# Patient Record
Sex: FEMALE | Race: WHITE | NOT HISPANIC OR LATINO | Employment: UNEMPLOYED | URBAN - METROPOLITAN AREA
[De-identification: names, ages, dates, MRNs, and addresses within clinical notes are randomized per-mention and may not be internally consistent; named-entity substitution may affect disease eponyms.]

---

## 2018-03-03 ENCOUNTER — COMMUNICATION - HEALTHEAST (OUTPATIENT)
Dept: SCHEDULING | Facility: CLINIC | Age: 58
End: 2018-03-03

## 2021-05-29 ENCOUNTER — RECORDS - HEALTHEAST (OUTPATIENT)
Dept: ADMINISTRATIVE | Facility: CLINIC | Age: 61
End: 2021-05-29

## 2021-12-02 ENCOUNTER — HOSPITAL ENCOUNTER (EMERGENCY)
Facility: CLINIC | Age: 61
Discharge: HOME | End: 2021-12-02
Admitting: EMERGENCY MEDICINE
Payer: COMMERCIAL

## 2021-12-02 ENCOUNTER — NURSE TRIAGE (OUTPATIENT)
Dept: NURSING | Facility: CLINIC | Age: 61
End: 2021-12-02
Payer: COMMERCIAL

## 2021-12-02 ENCOUNTER — APPOINTMENT (OUTPATIENT)
Dept: CT IMAGING | Facility: CLINIC | Age: 61
End: 2021-12-02
Payer: COMMERCIAL

## 2021-12-02 VITALS
WEIGHT: 180 LBS | TEMPERATURE: 98.6 F | HEART RATE: 70 BPM | RESPIRATION RATE: 18 BRPM | HEIGHT: 63 IN | OXYGEN SATURATION: 96 % | SYSTOLIC BLOOD PRESSURE: 120 MMHG | BODY MASS INDEX: 31.89 KG/M2 | DIASTOLIC BLOOD PRESSURE: 68 MMHG

## 2021-12-02 DIAGNOSIS — R10.9 FLANK PAIN: ICD-10-CM

## 2021-12-02 LAB
ALBUMIN SERPL-MCNC: 3.7 G/DL (ref 3.5–5)
ALBUMIN UR-MCNC: NEGATIVE MG/DL
ALP SERPL-CCNC: 59 U/L (ref 45–120)
ALT SERPL W P-5'-P-CCNC: 28 U/L (ref 0–45)
ANION GAP SERPL CALCULATED.3IONS-SCNC: 9 MMOL/L (ref 5–18)
APPEARANCE UR: CLEAR
AST SERPL W P-5'-P-CCNC: 24 U/L (ref 0–40)
BASOPHILS # BLD AUTO: 0 10E3/UL (ref 0–0.2)
BASOPHILS NFR BLD AUTO: 0 %
BILIRUB SERPL-MCNC: 0.2 MG/DL (ref 0–1)
BILIRUB UR QL STRIP: NEGATIVE
BUN SERPL-MCNC: 11 MG/DL (ref 8–22)
C REACTIVE PROTEIN LHE: 1 MG/DL (ref 0–0.8)
CALCIUM SERPL-MCNC: 8.7 MG/DL (ref 8.5–10.5)
CHLORIDE BLD-SCNC: 108 MMOL/L (ref 98–107)
CO2 SERPL-SCNC: 22 MMOL/L (ref 22–31)
COLOR UR AUTO: COLORLESS
CREAT SERPL-MCNC: 0.77 MG/DL (ref 0.6–1.1)
EOSINOPHIL # BLD AUTO: 0.2 10E3/UL (ref 0–0.7)
EOSINOPHIL NFR BLD AUTO: 4 %
ERYTHROCYTE [DISTWIDTH] IN BLOOD BY AUTOMATED COUNT: 12.5 % (ref 10–15)
GFR SERPL CREATININE-BSD FRML MDRD: 85 ML/MIN/1.73M2
GLUCOSE BLD-MCNC: 112 MG/DL (ref 70–125)
GLUCOSE UR STRIP-MCNC: NEGATIVE MG/DL
HCT VFR BLD AUTO: 35.3 % (ref 35–47)
HGB BLD-MCNC: 11.8 G/DL (ref 11.7–15.7)
HGB UR QL STRIP: NEGATIVE
IMM GRANULOCYTES # BLD: 0 10E3/UL
IMM GRANULOCYTES NFR BLD: 0 %
KETONES UR STRIP-MCNC: NEGATIVE MG/DL
LEUKOCYTE ESTERASE UR QL STRIP: NEGATIVE
LYMPHOCYTES # BLD AUTO: 1.2 10E3/UL (ref 0.8–5.3)
LYMPHOCYTES NFR BLD AUTO: 28 %
MCH RBC QN AUTO: 29.5 PG (ref 26.5–33)
MCHC RBC AUTO-ENTMCNC: 33.4 G/DL (ref 31.5–36.5)
MCV RBC AUTO: 88 FL (ref 78–100)
MONOCYTES # BLD AUTO: 0.4 10E3/UL (ref 0–1.3)
MONOCYTES NFR BLD AUTO: 8 %
MUCOUS THREADS #/AREA URNS LPF: PRESENT /LPF
NEUTROPHILS # BLD AUTO: 2.7 10E3/UL (ref 1.6–8.3)
NEUTROPHILS NFR BLD AUTO: 60 %
NITRATE UR QL: NEGATIVE
NRBC # BLD AUTO: 0 10E3/UL
NRBC BLD AUTO-RTO: 0 /100
PH UR STRIP: 5.5 [PH] (ref 5–7)
PLATELET # BLD AUTO: 147 10E3/UL (ref 150–450)
POTASSIUM BLD-SCNC: 3.9 MMOL/L (ref 3.5–5)
PROT SERPL-MCNC: 6.5 G/DL (ref 6–8)
RBC # BLD AUTO: 4 10E6/UL (ref 3.8–5.2)
RBC URINE: 4 /HPF
SODIUM SERPL-SCNC: 139 MMOL/L (ref 136–145)
SP GR UR STRIP: 1.02 (ref 1–1.03)
SQUAMOUS EPITHELIAL: 1 /HPF
TRANSITIONAL EPI: <1 /HPF
UROBILINOGEN UR STRIP-MCNC: <2 MG/DL
WBC # BLD AUTO: 4.5 10E3/UL (ref 4–11)
WBC URINE: 2 /HPF

## 2021-12-02 PROCEDURE — 36415 COLL VENOUS BLD VENIPUNCTURE: CPT | Performed by: PHYSICIAN ASSISTANT

## 2021-12-02 PROCEDURE — 81001 URINALYSIS AUTO W/SCOPE: CPT | Performed by: EMERGENCY MEDICINE

## 2021-12-02 PROCEDURE — 250N000011 HC RX IP 250 OP 636: Performed by: PHYSICIAN ASSISTANT

## 2021-12-02 PROCEDURE — 85025 COMPLETE CBC W/AUTO DIFF WBC: CPT | Performed by: PHYSICIAN ASSISTANT

## 2021-12-02 PROCEDURE — 99285 EMERGENCY DEPT VISIT HI MDM: CPT | Mod: 25

## 2021-12-02 PROCEDURE — 258N000003 HC RX IP 258 OP 636: Performed by: PHYSICIAN ASSISTANT

## 2021-12-02 PROCEDURE — 86141 C-REACTIVE PROTEIN HS: CPT | Performed by: PHYSICIAN ASSISTANT

## 2021-12-02 PROCEDURE — 96374 THER/PROPH/DIAG INJ IV PUSH: CPT

## 2021-12-02 PROCEDURE — 80053 COMPREHEN METABOLIC PANEL: CPT | Performed by: PHYSICIAN ASSISTANT

## 2021-12-02 PROCEDURE — 96361 HYDRATE IV INFUSION ADD-ON: CPT

## 2021-12-02 PROCEDURE — 74176 CT ABD & PELVIS W/O CONTRAST: CPT

## 2021-12-02 RX ORDER — KETOROLAC TROMETHAMINE 30 MG/ML
15 INJECTION, SOLUTION INTRAMUSCULAR; INTRAVENOUS ONCE
Status: COMPLETED | OUTPATIENT
Start: 2021-12-02 | End: 2021-12-02

## 2021-12-02 RX ORDER — SODIUM CHLORIDE 9 MG/ML
INJECTION, SOLUTION INTRAVENOUS CONTINUOUS
Status: DISCONTINUED | OUTPATIENT
Start: 2021-12-02 | End: 2021-12-02 | Stop reason: HOSPADM

## 2021-12-02 RX ADMIN — SODIUM CHLORIDE 1000 ML: 9 INJECTION, SOLUTION INTRAVENOUS at 11:03

## 2021-12-02 RX ADMIN — KETOROLAC TROMETHAMINE 15 MG: 30 INJECTION, SOLUTION INTRAMUSCULAR at 11:06

## 2021-12-02 ASSESSMENT — ENCOUNTER SYMPTOMS
NAUSEA: 0
FLANK PAIN: 1
NECK PAIN: 0
EYE DISCHARGE: 0
SHORTNESS OF BREATH: 0
CHEST TIGHTNESS: 0
VOMITING: 0
SORE THROAT: 1
WEAKNESS: 0
FEVER: 0
WOUND: 0
DIARRHEA: 0
TROUBLE SWALLOWING: 0
FREQUENCY: 0
NUMBNESS: 0
ABDOMINAL PAIN: 0
HEADACHES: 0
BACK PAIN: 0
CHILLS: 0
HEMATURIA: 0
DYSURIA: 0
COUGH: 1

## 2021-12-02 ASSESSMENT — MIFFLIN-ST. JEOR: SCORE: 1365.6

## 2021-12-02 NOTE — ED TRIAGE NOTES
The patient presents to the ED with c/o bilateral flank pain since last night. Patient reports she tested positive for COVID19 two days ago. Hx of kidney stones but these symptoms are different.

## 2021-12-02 NOTE — TELEPHONE ENCOUNTER
Patient is positive for covid and today is having severe low back pain, flank pain.  Patient states that her urine is clear color.  Patient states that she has a cough.  Patient can barely speak on the phone due to pain.  Patient states that she will go to ED.    COVID 19 Nurse Triage Plan/Patient Instructions    Please be aware that novel coronavirus (COVID-19) may be circulating in the community. If you develop symptoms such as fever, cough, or SOB or if you have concerns about the presence of another infection including coronavirus (COVID-19), please contact your health care provider or visit [email or web address].     Disposition/Instructions    ED Visit recommended. Follow protocol based instructions.     Bring Your Own Device:  Please also bring your smart device(s) (smart phones, tablets, laptops) and their charging cables for your personal use and to communicate with your care team during your visit.    Thank you for taking steps to prevent the spread of this virus.  o Limit your contact with others.  o Wear a simple mask to cover your cough.  o Wash your hands well and often.    Resources    [facility]: About COVID-19: [email or web address]    CDC: What to Do If You're Sick: www.cdc.gov/coronavirus/2019-ncov/about/steps-when-sick.html    CDC: Ending Home Isolation: www.cdc.gov/coronavirus/2019-ncov/hcp/disposition-in-home-patients.html     CDC: Caring for Someone: www.cdc.gov/coronavirus/2019-ncov/if-you-are-sick/care-for-someone.html     [facility]: Interim Guidance for Hospital Discharge to Home: www.health.[facility].[place]/diseases/coronavirus/hcp/hospdischarge.pdf    [facility] clinical trials (COVID-19 research studies): [email or web address]     Below are the COVID-19 hotlines at the [place] Department of Health ([facility]). Interpreters are available.   o For health questions: Call 694-968-1373 or 1-670.347.7553 (7 a.m. to 7 p.m.)  o For questions about  schools and childcare: Call 667-408-1739 or 1-574.860.8889 (7 a.m. to 7 p.m.)                       Reason for Disposition    SEVERE pain (e.g., excruciating, scale 8-10) and present > 1 hour    Additional Information    Negative: Passed out (i.e., lost consciousness, collapsed and was not responding)    Negative: Shock suspected (e.g., cold/pale/clammy skin, too weak to stand, low BP, rapid pulse)    Negative: Sounds like a life-threatening emergency to the triager    Protocols used: FLANK PAIN-A-OH

## 2021-12-02 NOTE — ED PROVIDER NOTES
EMERGENCY DEPARTMENT ENCOUNTER      NAME: Regina Finley  AGE: 60 year old female  YOB: 1960  MRN: [id number]  EVALUATION DATE & TIME: 12/2/2021 10:50 AM    PCP: Paris Aparicio    ED PROVIDER: Jesu Albrecht PA-C      Chief Complaint   Patient presents with     Flank Pain     bilateral since last night         FINAL IMPRESSION:  1. Flank pain          MEDICAL DECISION MAKING:    Pertinent Labs & Imaging studies reviewed. (See chart for details)  60 year old female presents to the Emergency Department for evaluation of bilateral flank pain, hematuria with known history of kidney stones.    After obtaining history present illness, reviewing vitals and briefly examined the patient plan to assess with urinalysis, screening labs, CT scan and plan for medication with Toradol.  To note patient has been diagnosed with COVID-19 recently however she does not appear in any type of respiratory distress and  denies significant respiratory symptoms.  No need for oxygen supplementation.    Patient feeling significantly improved after the Toradol.  Her work-up is essentially unremarkable here in the emergency department, specifically there is no evidence of hydronephrosis, stranding around the kidneys, or ureteral stone.  Presentation could certainly represent a recently passed kidney stone and she may have been having some ureteral colic.  Either way I counseled her with regards to use of ibuprofen and Tylenol as needed for pain certainly she can alternate heat and ice, drinking plenty of fluids would make sense as well.  No prescriptions needed at this time.  She will follow-up as an outpatient through primary care or return to the ED if there is worsening symptoms.    ED COURSE    10:57 AM I met with the patient, obtained history, performed an initial exam, and discussed options and plan for diagnostics and treatment here in the ED. PPE worn including N95 mask, surgical gloves, eye protection.  11:37 AM Nursing  reports the patient's pain is almost entirely resolved after Toradol.  12:42 PM I rechecked and updated the patient. Discussed plans for discharge.    At the conclusion of the encounter I discussed the results of all of the tests and the disposition. The questions were answered. The patient or family acknowledged understanding and was agreeable with the care plan.     MEDICATIONS GIVEN IN THE EMERGENCY:  Medications   0.9% sodium chloride BOLUS (0 mLs Intravenous Stopped 12/2/21 6022)     Followed by   sodium chloride 0.9% infusion (has no administration in time range)   ketorolac (TORADOL) injection 15 mg (15 mg Intravenous Given 12/2/21 1106)       NEW PRESCRIPTIONS STARTED AT TODAY'S ER VISIT  Discharge Medication List as of 12/2/2021 12:53 PM               =================================================================    HPI    Patient information was obtained from: Patient    Use of Interpretor: N/A        Reginakim Finley is a 60 year old female with a pertinent history of hypothyroidism who presents to this ED by walk in for evaluation of flank pain. Patient reports developing a sudden onset of bilateral flank pain yesterday. She reports that during the night, her pain was so severe that she was began to cry. Patient attempted to take a shower which with mild relief. She took ibuprofen, half a tablet of percocet, but has not taken anything for her pain today.    Incidentally, she reports testing positive for COVID-19 on 11/30. Her symptoms consist of sore throat, cough and denies chest pain, shortness of breath. Patient reports she is a  and a majority of the customers there do no wear masks. Patient is vaccinated for COVID-19.    Patient denies any associated falls or other trauma. Denies vomiting.    SHx- Patient works as a .      REVIEW OF SYSTEMS   Review of Systems   Constitutional: Negative for chills and fever.   HENT: Positive for sore throat. Negative for congestion and trouble  swallowing.    Eyes: Negative for discharge and visual disturbance.   Respiratory: Positive for cough. Negative for chest tightness and shortness of breath.    Cardiovascular: Negative for chest pain and leg swelling.   Gastrointestinal: Negative for abdominal pain, diarrhea, nausea and vomiting.   Genitourinary: Positive for flank pain (bilateral). Negative for dysuria, frequency, hematuria and urgency.   Musculoskeletal: Negative for back pain, gait problem and neck pain.   Skin: Negative for rash and wound.   Neurological: Negative for weakness, numbness and headaches.   Psychiatric/Behavioral: Negative for behavioral problems and suicidal ideas.   All other systems reviewed and are negative.         PAST MEDICAL HISTORY:  No past medical history on file.    PAST SURGICAL HISTORY:  No past surgical history on file.      CURRENT MEDICATIONS:      Current Facility-Administered Medications:      [COMPLETED] 0.9% sodium chloride BOLUS, 1,000 mL, Intravenous, Once, Stopped at 12/02/21 1252 **FOLLOWED BY** sodium chloride 0.9% infusion, , Intravenous, Continuous, Jesu Albrecht PA-C    Current Outpatient Medications:      oxyCODONE-acetaminophen (PERCOCET) 5-325 mg per tablet, [OXYCODONE-ACETAMINOPHEN (PERCOCET) 5-325 MG PER TABLET] Take 1 tablet by mouth every 4 (four) hours as needed for pain., Disp: 15 tablet, Rfl: 0      ALLERGIES:  Allergies   Allergen Reactions     Amphetamine-Dextroamphetamine Hives     XR caused problems wants to try immediate release     Clindamycin Unknown     Causes diarrhea and abdominal cramps.        FAMILY HISTORY:  No family history on file.    SOCIAL HISTORY:   Social History     Socioeconomic History     Marital status: Single     Spouse name: Not on file     Number of children: Not on file     Years of education: Not on file     Highest education level: Not on file   Occupational History     Not on file   Tobacco Use     Smoking status: Current Every Day Smoker     Smokeless  "tobacco: Not on file   Substance and Sexual Activity     Alcohol use: No     Drug use: Not on file     Sexual activity: Not on file   Other Topics Concern     Not on file   Social History Narrative    Lives with family.      Social Determinants of Health     Financial Resource Strain: Not on file   Food Insecurity: Not on file   Transportation Needs: Not on file   Physical Activity: Not on file   Stress: Not on file   Social Connections: Not on file   Intimate Partner Violence: Not on file   Housing Stability: Not on file       VITALS:  Patient Vitals for the past 24 hrs:   BP Temp Temp src Pulse Resp SpO2 Height Weight   12/02/21 1256 120/68 -- -- 70 18 96 % -- --   12/02/21 1125 132/66 -- -- 77 -- 96 % -- --   12/02/21 0920 (!) 153/78 98.6  F (37  C) Temporal 93 22 97 % 1.6 m (5' 3\") 81.6 kg (180 lb)       PHYSICAL EXAM    Physical Exam  Vitals and nursing note reviewed.   Constitutional:       General: She is in acute distress.      Appearance: She is normal weight. She is not toxic-appearing.   HENT:      Head: Normocephalic.      Right Ear: External ear normal.      Left Ear: External ear normal.      Nose: Nose normal.   Eyes:      Conjunctiva/sclera: Conjunctivae normal.   Cardiovascular:      Rate and Rhythm: Normal rate.   Pulmonary:      Effort: Pulmonary effort is normal. No respiratory distress.   Abdominal:      General: Abdomen is flat. Bowel sounds are normal. There is no distension.      Tenderness: There is no abdominal tenderness. There is right CVA tenderness and left CVA tenderness.      Hernia: No hernia is present.   Musculoskeletal:         General: No tenderness or deformity. Normal range of motion.   Skin:     General: Skin is warm and dry.      Findings: No rash.   Neurological:      General: No focal deficit present.      Mental Status: She is alert. Mental status is at baseline.      Sensory: No sensory deficit.      Motor: No weakness.   Psychiatric:         Mood and Affect: Mood normal. "         Behavior: Behavior normal.          LAB:  All pertinent labs reviewed and interpreted.  Results for orders placed or performed during the hospital encounter of 12/02/21   CT Abdomen Pelvis w/o Contrast    Impression    IMPRESSION:   1.  No acute abnormality in the abdomen or pelvis.     UA with Microscopic reflex to Culture    Specimen: Urine, Midstream   Result Value Ref Range    Color Urine Colorless Colorless, Straw, Light Yellow, Yellow    Appearance Urine Clear Clear    Glucose Urine Negative Negative mg/dL    Bilirubin Urine Negative Negative    Ketones Urine Negative Negative mg/dL    Specific Gravity Urine 1.018 1.001 - 1.030    Blood Urine Negative Negative    pH Urine 5.5 5.0 - 7.0    Protein Albumin Urine Negative Negative mg/dL    Urobilinogen Urine <2.0 <2.0 mg/dL    Nitrite Urine Negative Negative    Leukocyte Esterase Urine Negative Negative    Mucus Urine Present (A) None Seen /LPF    RBC Urine 4 (H) <=2 /HPF    WBC Urine 2 <=5 /HPF    Squamous Epithelials Urine 1 <=1 /HPF    Transitional Epithelials Urine <1 <=1 /HPF   Comprehensive metabolic panel   Result Value Ref Range    Sodium 139 136 - 145 mmol/L    Potassium 3.9 3.5 - 5.0 mmol/L    Chloride 108 (H) 98 - 107 mmol/L    Carbon Dioxide (CO2) 22 22 - 31 mmol/L    Anion Gap 9 5 - 18 mmol/L    Urea Nitrogen 11 8 - 22 mg/dL    Creatinine 0.77 0.60 - 1.10 mg/dL    Calcium 8.7 8.5 - 10.5 mg/dL    Glucose 112 70 - 125 mg/dL    Alkaline Phosphatase 59 45 - 120 U/L    AST 24 0 - 40 U/L    ALT 28 0 - 45 U/L    Protein Total 6.5 6.0 - 8.0 g/dL    Albumin 3.7 3.5 - 5.0 g/dL    Bilirubin Total 0.2 0.0 - 1.0 mg/dL    GFR Estimate 85 >60 mL/min/1.73m2   CRP inflammation   Result Value Ref Range    CRP 1.0 (H) 0.0-<0.8 mg/dL   CBC with platelets and differential   Result Value Ref Range    WBC Count 4.5 4.0 - 11.0 10e3/uL    RBC Count 4.00 3.80 - 5.20 10e6/uL    Hemoglobin 11.8 11.7 - 15.7 g/dL    Hematocrit 35.3 35.0 - 47.0 %    MCV 88 78 - 100 fL     MCH 29.5 26.5 - 33.0 pg    MCHC 33.4 31.5 - 36.5 g/dL    RDW 12.5 10.0 - 15.0 %    Platelet Count 147 (L) 150 - 450 10e3/uL    % Neutrophils 60 %    % Lymphocytes 28 %    % Monocytes 8 %    % Eosinophils 4 %    % Basophils 0 %    % Immature Granulocytes 0 %    NRBCs per 100 WBC 0 <1 /100    Absolute Neutrophils 2.7 1.6 - 8.3 10e3/uL    Absolute Lymphocytes 1.2 0.8 - 5.3 10e3/uL    Absolute Monocytes 0.4 0.0 - 1.3 10e3/uL    Absolute Eosinophils 0.2 0.0 - 0.7 10e3/uL    Absolute Basophils 0.0 0.0 - 0.2 10e3/uL    Absolute Immature Granulocytes 0.0 <=0.4 10e3/uL    Absolute NRBCs 0.0 10e3/uL       RADIOLOGY:  Reviewed all pertinent imaging. Please see official radiology report.  CT Abdomen Pelvis w/o Contrast   Final Result   IMPRESSION:    1.  No acute abnormality in the abdomen or pelvis.                 I, Johnson Corona, am serving as a scribe to document services personally performed by Jesu Albrecht PA-C based on my observation and the provider's statements to me. I, Jesu Albrecht PA-C attest that Johnson Corona is acting in a scribe capacity, has observed my performance of the services and has documented them in accordance with my direction.    Jesu Albrecht PA-C  Emergency Medicine  [facility]     Jesu Albrecht PA-C  12/02/21 2702

## 2021-12-02 NOTE — DISCHARGE INSTRUCTIONS
Your CT scan today did not show any type of obstructive kidney stones in the ureters, no evidence of hydronephrosis or fluid backed up on the kidneys.  Your symptoms did improve with Toradol therefore I do recommend the use of ibuprofen or Tylenol as needed for ongoing symptoms if they return.  There is no evidence of infection on your urinalysis therefore no need for antibiotics.  Overall the work-up was benign and you are feeling improved.  Certainly there could be a portion of this that is musculoskeletal certainly the same type of treatment with Tylenol ibuprofen would make sense as well as alternating heat and ice.  If you have new or worsening symptoms consider returning to the ED.

## 2023-02-24 ENCOUNTER — HOSPITAL ENCOUNTER (EMERGENCY)
Facility: CLINIC | Age: 63
Discharge: HOME | End: 2023-02-24
Attending: EMERGENCY MEDICINE | Admitting: EMERGENCY MEDICINE
Payer: COMMERCIAL

## 2023-02-24 ENCOUNTER — APPOINTMENT (OUTPATIENT)
Dept: CT IMAGING | Facility: CLINIC | Age: 63
End: 2023-02-24
Attending: NURSE PRACTITIONER
Payer: COMMERCIAL

## 2023-02-24 ENCOUNTER — APPOINTMENT (OUTPATIENT)
Dept: RADIOLOGY | Facility: CLINIC | Age: 63
End: 2023-02-24
Attending: NURSE PRACTITIONER
Payer: COMMERCIAL

## 2023-02-24 VITALS
HEART RATE: 68 BPM | HEIGHT: 63 IN | BODY MASS INDEX: 33.49 KG/M2 | WEIGHT: 189 LBS | OXYGEN SATURATION: 97 % | SYSTOLIC BLOOD PRESSURE: 139 MMHG | DIASTOLIC BLOOD PRESSURE: 95 MMHG | RESPIRATION RATE: 17 BRPM | TEMPERATURE: 98.5 F

## 2023-02-24 DIAGNOSIS — S01.81XA LACERATION OF FOREHEAD, INITIAL ENCOUNTER: ICD-10-CM

## 2023-02-24 DIAGNOSIS — R55 SYNCOPE, UNSPECIFIED SYNCOPE TYPE: ICD-10-CM

## 2023-02-24 DIAGNOSIS — S09.90XA INJURY OF HEAD, INITIAL ENCOUNTER: ICD-10-CM

## 2023-02-24 DIAGNOSIS — S80.12XA CONTUSION OF LEFT LOWER EXTREMITY, INITIAL ENCOUNTER: ICD-10-CM

## 2023-02-24 PROBLEM — K43.9 VENTRAL HERNIA WITHOUT OBSTRUCTION OR GANGRENE: Status: ACTIVE | Noted: 2023-02-24

## 2023-02-24 PROBLEM — L25.9 CONTACT DERMATITIS AND ECZEMA: Status: ACTIVE | Noted: 2023-02-24

## 2023-02-24 LAB
ALBUMIN SERPL-MCNC: 4.2 G/DL (ref 3.5–5)
ALBUMIN UR-MCNC: NEGATIVE MG/DL
ALP SERPL-CCNC: 81 U/L (ref 45–120)
ALT SERPL W P-5'-P-CCNC: 22 U/L (ref 0–45)
ANION GAP SERPL CALCULATED.3IONS-SCNC: 10 MMOL/L (ref 5–18)
APPEARANCE UR: CLEAR
AST SERPL W P-5'-P-CCNC: 19 U/L (ref 0–40)
ATRIAL RATE - MUSE: 81 BPM
BASOPHILS # BLD AUTO: 0 10E3/UL (ref 0–0.2)
BASOPHILS NFR BLD AUTO: 0 %
BILIRUB SERPL-MCNC: 0.2 MG/DL (ref 0–1)
BILIRUB UR QL STRIP: NEGATIVE
BUN SERPL-MCNC: 17 MG/DL (ref 8–22)
CALCIUM SERPL-MCNC: 9.6 MG/DL (ref 8.5–10.5)
CHLORIDE BLD-SCNC: 106 MMOL/L (ref 98–107)
CO2 SERPL-SCNC: 23 MMOL/L (ref 22–31)
COLOR UR AUTO: ABNORMAL
CREAT SERPL-MCNC: 0.9 MG/DL (ref 0.6–1.1)
DIASTOLIC BLOOD PRESSURE - MUSE: NORMAL MMHG
EOSINOPHIL # BLD AUTO: 0.1 10E3/UL (ref 0–0.7)
EOSINOPHIL NFR BLD AUTO: 2 %
ERYTHROCYTE [DISTWIDTH] IN BLOOD BY AUTOMATED COUNT: 12.8 % (ref 10–15)
GFR SERPL CREATININE-BSD FRML MDRD: 73 ML/MIN/1.73M2
GLUCOSE BLD-MCNC: 107 MG/DL (ref 70–125)
GLUCOSE UR STRIP-MCNC: NEGATIVE MG/DL
HCT VFR BLD AUTO: 39.7 % (ref 35–47)
HGB BLD-MCNC: 13.2 G/DL (ref 11.7–15.7)
HGB UR QL STRIP: NEGATIVE
IMM GRANULOCYTES # BLD: 0 10E3/UL
IMM GRANULOCYTES NFR BLD: 0 %
INTERPRETATION ECG - MUSE: NORMAL
KETONES UR STRIP-MCNC: NEGATIVE MG/DL
LEUKOCYTE ESTERASE UR QL STRIP: NEGATIVE
LYMPHOCYTES # BLD AUTO: 1.7 10E3/UL (ref 0.8–5.3)
LYMPHOCYTES NFR BLD AUTO: 32 %
MCH RBC QN AUTO: 28.9 PG (ref 26.5–33)
MCHC RBC AUTO-ENTMCNC: 33.2 G/DL (ref 31.5–36.5)
MCV RBC AUTO: 87 FL (ref 78–100)
MONOCYTES # BLD AUTO: 0.4 10E3/UL (ref 0–1.3)
MONOCYTES NFR BLD AUTO: 7 %
MUCOUS THREADS #/AREA URNS LPF: PRESENT /LPF
NEUTROPHILS # BLD AUTO: 3.1 10E3/UL (ref 1.6–8.3)
NEUTROPHILS NFR BLD AUTO: 59 %
NITRATE UR QL: NEGATIVE
NRBC # BLD AUTO: 0 10E3/UL
NRBC BLD AUTO-RTO: 0 /100
P AXIS - MUSE: 69 DEGREES
PH UR STRIP: 5.5 [PH] (ref 5–7)
PLATELET # BLD AUTO: 233 10E3/UL (ref 150–450)
POTASSIUM BLD-SCNC: 4.1 MMOL/L (ref 3.5–5)
PR INTERVAL - MUSE: 148 MS
PROT SERPL-MCNC: 7.5 G/DL (ref 6–8)
QRS DURATION - MUSE: 84 MS
QT - MUSE: 366 MS
QTC - MUSE: 425 MS
R AXIS - MUSE: 45 DEGREES
RBC # BLD AUTO: 4.57 10E6/UL (ref 3.8–5.2)
RBC URINE: 2 /HPF
SODIUM SERPL-SCNC: 139 MMOL/L (ref 136–145)
SP GR UR STRIP: 1.02 (ref 1–1.03)
SQUAMOUS EPITHELIAL: 3 /HPF
SYSTOLIC BLOOD PRESSURE - MUSE: NORMAL MMHG
T AXIS - MUSE: 59 DEGREES
T3 SERPL-MCNC: 107 NG/DL (ref 85–202)
T4 FREE SERPL-MCNC: 1.93 NG/DL (ref 0.9–1.7)
TROPONIN I SERPL-MCNC: <0.01 NG/ML (ref 0–0.29)
TSH SERPL DL<=0.005 MIU/L-ACNC: 0.08 UIU/ML (ref 0.3–5)
UROBILINOGEN UR STRIP-MCNC: <2 MG/DL
VENTRICULAR RATE- MUSE: 81 BPM
WBC # BLD AUTO: 5.3 10E3/UL (ref 4–11)
WBC URINE: 1 /HPF

## 2023-02-24 PROCEDURE — 90471 IMMUNIZATION ADMIN: CPT | Performed by: NURSE PRACTITIONER

## 2023-02-24 PROCEDURE — 90715 TDAP VACCINE 7 YRS/> IM: CPT | Performed by: NURSE PRACTITIONER

## 2023-02-24 PROCEDURE — 250N000013 HC RX MED GY IP 250 OP 250 PS 637: Performed by: NURSE PRACTITIONER

## 2023-02-24 PROCEDURE — 12011 RPR F/E/E/N/L/M 2.5 CM/<: CPT

## 2023-02-24 PROCEDURE — 85004 AUTOMATED DIFF WBC COUNT: CPT | Performed by: NURSE PRACTITIONER

## 2023-02-24 PROCEDURE — 36415 COLL VENOUS BLD VENIPUNCTURE: CPT | Performed by: NURSE PRACTITIONER

## 2023-02-24 PROCEDURE — 84443 ASSAY THYROID STIM HORMONE: CPT | Performed by: NURSE PRACTITIONER

## 2023-02-24 PROCEDURE — 250N000009 HC RX 250: Performed by: NURSE PRACTITIONER

## 2023-02-24 PROCEDURE — 81001 URINALYSIS AUTO W/SCOPE: CPT | Performed by: NURSE PRACTITIONER

## 2023-02-24 PROCEDURE — 99285 EMERGENCY DEPT VISIT HI MDM: CPT | Mod: 25

## 2023-02-24 PROCEDURE — 93005 ELECTROCARDIOGRAM TRACING: CPT | Mod: XU | Performed by: EMERGENCY MEDICINE

## 2023-02-24 PROCEDURE — 84484 ASSAY OF TROPONIN QUANT: CPT | Performed by: NURSE PRACTITIONER

## 2023-02-24 PROCEDURE — 84480 ASSAY TRIIODOTHYRONINE (T3): CPT | Performed by: NURSE PRACTITIONER

## 2023-02-24 PROCEDURE — 80053 COMPREHEN METABOLIC PANEL: CPT | Performed by: NURSE PRACTITIONER

## 2023-02-24 PROCEDURE — 70450 CT HEAD/BRAIN W/O DYE: CPT

## 2023-02-24 PROCEDURE — 73552 X-RAY EXAM OF FEMUR 2/>: CPT | Mod: LT

## 2023-02-24 PROCEDURE — 250N000011 HC RX IP 250 OP 636: Performed by: NURSE PRACTITIONER

## 2023-02-24 PROCEDURE — 84439 ASSAY OF FREE THYROXINE: CPT | Performed by: NURSE PRACTITIONER

## 2023-02-24 RX ORDER — LIOTHYRONINE SODIUM 5 UG/1
TABLET ORAL
COMMUNITY
Start: 2023-02-16

## 2023-02-24 RX ORDER — ACETAMINOPHEN 500 MG
1000 TABLET ORAL ONCE
Status: COMPLETED | OUTPATIENT
Start: 2023-02-24 | End: 2023-02-24

## 2023-02-24 RX ORDER — DEXTROAMPHETAMINE SACCHARATE, AMPHETAMINE ASPARTATE, DEXTROAMPHETAMINE SULFATE AND AMPHETAMINE SULFATE 7.5; 7.5; 7.5; 7.5 MG/1; MG/1; MG/1; MG/1
30 TABLET ORAL
COMMUNITY
Start: 2022-12-01

## 2023-02-24 RX ORDER — LEVOTHYROXINE SODIUM 200 UG/1
1 TABLET ORAL DAILY
COMMUNITY
Start: 2023-01-04

## 2023-02-24 RX ADMIN — Medication 3 ML: at 15:13

## 2023-02-24 RX ADMIN — CLOSTRIDIUM TETANI TOXOID ANTIGEN (FORMALDEHYDE INACTIVATED), CORYNEBACTERIUM DIPHTHERIAE TOXOID ANTIGEN (FORMALDEHYDE INACTIVATED), BORDETELLA PERTUSSIS TOXOID ANTIGEN (GLUTARALDEHYDE INACTIVATED), BORDETELLA PERTUSSIS FILAMENTOUS HEMAGGLUTININ ANTIGEN (FORMALDEHYDE INACTIVATED), BORDETELLA PERTUSSIS PERTACTIN ANTIGEN, AND BORDETELLA PERTUSSIS FIMBRIAE 2/3 ANTIGEN 0.5 ML: 5; 2; 2.5; 5; 3; 5 INJECTION, SUSPENSION INTRAMUSCULAR at 15:17

## 2023-02-24 RX ADMIN — ACETAMINOPHEN 1000 MG: 500 TABLET ORAL at 15:12

## 2023-02-24 ASSESSMENT — ENCOUNTER SYMPTOMS
FEVER: 0
LOSS OF CONSCIOUSNESS: 1
DYSURIA: 0
NECK PAIN: 0
BLOOD IN STOOL: 0
CHILLS: 0
SEIZURES: 0
BACK PAIN: 1
HEADACHES: 1
WEAKNESS: 0
SHORTNESS OF BREATH: 0

## 2023-02-24 ASSESSMENT — ACTIVITIES OF DAILY LIVING (ADL): ADLS_ACUITY_SCORE: 35

## 2023-02-24 NOTE — ED TRIAGE NOTES
Patient had syncopal episode last night. She reports taking melatonin around 2300, then woke up in bathroom slumped over the tub at 0100. She has laceration near left eyebrow. Pain to left lateral thigh. A+O in triage, ambulatory.      Triage Assessment     Row Name 02/24/23 1319       Triage Assessment (Adult)    Airway WDL WDL       Respiratory WDL    Respiratory WDL WDL       Skin Circulation/Temperature WDL    Skin Circulation/Temperature WDL X  laceration near left eyebrow       Peripheral/Neurovascular WDL    Peripheral Neurovascular WDL WDL       Cognitive/Neuro/Behavioral WDL    Cognitive/Neuro/Behavioral WDL X  syncope last night    Level of Consciousness alert    Arousal Level opens eyes spontaneously    Orientation oriented x 4

## 2023-02-24 NOTE — DISCHARGE INSTRUCTIONS
YOU HAVE HAD A HEAD INJURY TODAY.  THIS MAY BE A CONCUSSION.    Your HEAD CT did not show any bleeding on the brain.    PLEASE DO THE FOLLOWING TO CARE FOR YOURSELF  or YOUR LOVED ONE AFTER DISCHARGE:  Schedule a follow up clinic appointment with your doctor in one week for a recheck on your concussionsymptoms and to determine when it is safe to return to physical activities.  Do NOT participate in any contact sports until cleared by your doctor to return to sports.  You must be completely asymptomatic for 1 week to be cleared to contact activities or sports.     You should take ibuprofen, 600mg, threetimes per day as needed for pain.  You can also use acetaminophen, 650 mg, up to four times per day as needed for pain.  You can use these medications together, there are no concerning interactions.  We cleaned and closed the wound in the ER today.      TO CARE FOR THE WOUND AT HOME:  Keep the wound CLEAN,DRY and COVERED until the sutures are out. Okay to shower.  Apply ice for 20 minutes 3 times daily to your left leg injury for 2 days  Watch for signs of infection (redness, increasing pain or yellow drainage) and if they develop, come back to the Emergency Department immediately for treatment.  Follow up in clinic in 5 days for suture removal.       PLEASE RETURN TO THE EMERGENCYDEPARTMENT IMMEDIATELY FOR REPEAT EVALUATION, IF ANY OF THE FOLLOWING SIGNS OR SYMPTOMS DEVELOP IN THE NEXT 48 HOURS:  Worsening, severe headache  Headache and vomiting   Weakness, numbness or incoordination  Slurred speech  Seizure  Inability to recognize familiar people or places  Develop restlessness or agitation   One pupil becomes larger than the other pupil  Inability to wake up    ---------------------------------------------------------------------------------------------------

## 2023-02-24 NOTE — ED PROVIDER NOTES
"ED CONSULTATION  Date/Time:2/24/2023 3:29 PM    I am seeing this patient along with Alhaji Bautista CNP.  I, Chanell Dmuont MD have reviewed the documentation, personally taken the patient's history, performed an exam and agree with the physical finds, diagnosis and management plan.    HPI: Regina Finley is a 62 year old female who presents to the ED with fall, head injury, and syncope.     The creation of this record is based on the scribe s observations of the work being performed by Chanell Dumont MD and the provider s statements to them. It was created on her behalf by Julius mcfarlane trained medical scribe. This document has been checked and approved by the attending provider.    Physical Exam:BP (!) 139/95   Pulse 68   Temp 98.5  F (36.9  C) (Temporal)   Resp 17   Ht 1.6 m (5' 3\")   Wt 85.7 kg (189 lb)   SpO2 97%   BMI 33.48 kg/m    Constitutional: Well developed, well nourished, no acute distress   EYES: Conjunctivae clear  HENT: Atraumatic, external ears normal, nose normal, oropharynx moist. Neck- supple. Laceration on left side of forehead.  Respiratory: No respiratory distress, normal breath sounds, no rales, no wheezing   Cardiovascular: Normal rate, normal rhythm, no murmurs, no gallops, no rubs. No chest tenderness  GI: Soft, nondistended, normal bowel sounds, nontender, no organomegaly, no masses, no rebound, no guarding   : No CVA tenderness  Musculoskeletal: No edema, No cyanosis, No clubbing. Good range of motion in all major joints. No tenderness to palpation noted. Tenderness on left thoracic back. Deformities on left lateral thigh.  Integument: Warm, Dry, No erythema, No rash.   Neurologic: Alert & oriented x 3, no focal deficits noted, ambulatory  Psych: Affect normal, Judgment normal, Mood normal.    MDM:    Patient presents after a syncopal event last evening.  She reports that she took melatonin for the first time and was very drowsy when she got up to go to the bathroom. "  She is unclear exactly what happened but she knows she awoke and fallen over the lip of the bathtub.  She states that she realized she was bleeding, but she was so tired that she went back to bed.  She states otherwise, she started T-3 last week, otherwise no other medication changes.  She denies any chest pain, shortness of breath.  Laboratory studies are within normal limits.  CT scan of the head is unremarkable.  ECG with no significant arrhythmia or ischemic changes.  I suspect that her symptoms are secondary to initiation of melatonin last evening.  Per [place] syncope rule, the patient is low risk and can be seen as an outpatient.  Patient was referred to rapid access cardiology clinic.  Patient is comfortable with this plan.    3:10 PM I met with the patient.  4:42 PM I rechecked on the patient. We discussed plan for discharge.    Results for orders placed or performed during the hospital encounter of 02/24/23   Head CT w/o contrast    Impression    IMPRESSION:  1.  No acute intracranial abnormality.   XR Femur Left 2 Views    Impression    IMPRESSION: Negative femur. No acute fracture degenerative change of the pubic symphysis stable.   Troponin I (now)   Result Value Ref Range    Troponin I <0.01 0.00 - 0.29 ng/mL   Comprehensive metabolic panel   Result Value Ref Range    Sodium 139 136 - 145 mmol/L    Potassium 4.1 3.5 - 5.0 mmol/L    Chloride 106 98 - 107 mmol/L    Carbon Dioxide (CO2) 23 22 - 31 mmol/L    Anion Gap 10 5 - 18 mmol/L    Urea Nitrogen 17 8 - 22 mg/dL    Creatinine 0.90 0.60 - 1.10 mg/dL    Calcium 9.6 8.5 - 10.5 mg/dL    Glucose 107 70 - 125 mg/dL    Alkaline Phosphatase 81 45 - 120 U/L    AST 19 0 - 40 U/L    ALT 22 0 - 45 U/L    Protein Total 7.5 6.0 - 8.0 g/dL    Albumin 4.2 3.5 - 5.0 g/dL    Bilirubin Total 0.2 0.0 - 1.0 mg/dL    GFR Estimate 73 >60 mL/min/1.73m2   UA with Microscopic reflex to Culture    Specimen: Urine, Clean Catch   Result Value Ref Range    Color Urine Light  Yellow Colorless, Straw, Light Yellow, Yellow    Appearance Urine Clear Clear    Glucose Urine Negative Negative mg/dL    Bilirubin Urine Negative Negative    Ketones Urine Negative Negative mg/dL    Specific Gravity Urine 1.019 1.001 - 1.030    Blood Urine Negative Negative    pH Urine 5.5 5.0 - 7.0    Protein Albumin Urine Negative Negative mg/dL    Urobilinogen Urine <2.0 <2.0 mg/dL    Nitrite Urine Negative Negative    Leukocyte Esterase Urine Negative Negative    Mucus Urine Present (A) None Seen /LPF    RBC Urine 2 <=2 /HPF    WBC Urine 1 <=5 /HPF    Squamous Epithelials Urine 3 (H) <=1 /HPF   CBC with platelets and differential   Result Value Ref Range    WBC Count 5.3 4.0 - 11.0 10e3/uL    RBC Count 4.57 3.80 - 5.20 10e6/uL    Hemoglobin 13.2 11.7 - 15.7 g/dL    Hematocrit 39.7 35.0 - 47.0 %    MCV 87 78 - 100 fL    MCH 28.9 26.5 - 33.0 pg    MCHC 33.2 31.5 - 36.5 g/dL    RDW 12.8 10.0 - 15.0 %    Platelet Count 233 150 - 450 10e3/uL    % Neutrophils 59 %    % Lymphocytes 32 %    % Monocytes 7 %    % Eosinophils 2 %    % Basophils 0 %    % Immature Granulocytes 0 %    NRBCs per 100 WBC 0 <1 /100    Absolute Neutrophils 3.1 1.6 - 8.3 10e3/uL    Absolute Lymphocytes 1.7 0.8 - 5.3 10e3/uL    Absolute Monocytes 0.4 0.0 - 1.3 10e3/uL    Absolute Eosinophils 0.1 0.0 - 0.7 10e3/uL    Absolute Basophils 0.0 0.0 - 0.2 10e3/uL    Absolute Immature Granulocytes 0.0 <=0.4 10e3/uL    Absolute NRBCs 0.0 10e3/uL   TSH with free T4 reflex   Result Value Ref Range    TSH 0.08 (L) 0.30 - 5.00 uIU/mL   ECG 12-LEAD WITH MUSE (LHE)   Result Value Ref Range    Systolic Blood Pressure  mmHg    Diastolic Blood Pressure  mmHg    Ventricular Rate 81 BPM    Atrial Rate 81 BPM    NV Interval 148 ms    QRS Duration 84 ms     ms    QTc 425 ms    P Axis 69 degrees    R AXIS 45 degrees    T Axis 59 degrees    Interpretation ECG       Sinus rhythm  Normal ECG  When compared with ECG of 03-MAR-2016 14:40,  Nonspecific T wave  abnormality no longer evident in Inferior leads  Confirmed by SEE ED PROVIDER NOTE FOR, ECG INTERPRETATION ([id number]),  ANAY ABEL ([id number]) on 2/24/2023 1:47:29 PM           1. Laceration of forehead, initial encounter    2. Injury of head, initial encounter    3. Contusion of left lower extremity, initial encounter    4. Syncope, unspecified syncope type          Discharge Medication List as of 2/24/2023  4:43 PM          Final disposition will be per the depending diagnostic studies and patient's clinical trajectory.    This is an accurate record of my words and actions during this visit as documented by the scribe.      Chanell Dumont MD  02/24/23 [id number]

## 2023-02-24 NOTE — ED PROVIDER NOTES
EMERGENCY DEPARTMENT ENCOUNTER      NAME: Regina Finley  AGE: 62 year old female  YOB: 1960  MRN: [id number]  EVALUATION DATE & TIME: 2023  1:23 PM    PCP: Paris Aparicio    ED PROVIDER: JANETTE Bueno, CNP      Chief Complaint   Patient presents with     Fall     Head Injury     Syncope         FINAL IMPRESSION:  1. Syncope    2. Laceration of forehead, initial encounter    3. Injury of head, initial encounter    4. Contusion of left lower extremity, initial encounter          ED COURSE & MEDICAL DECISION MAKIN:47 PM I met with the patient, obtained history, performed an initial exam, and discussed options and plan for treatment here in the ED.  2:40 PM case staffed with Dr Dumont.  3:50 PM laceration repair.  4:16 PM care signed out to Dr Dumont.    Pertinent Labs & Imaging studies reviewed. (See chart for details)  62 year old female presents to the Emergency Department for evaluation of syncope. Subsequent head injury and left thigh injury. CT head negative for acute intracranial process.  X-ray of the left femur negative for fracture.  The forehead laceration was irrigated and repaired.     Etiology for syncope unclear. Life threatening differential diagnoses considered include: cardiac arrhythmia, acute blood loss and intracranial bleed. Other differential diagnoses include, but are not limited to: vasovagal syncope, orthostatic syncope, seizure, as well as other etiologies. EKG is normal.  Metabolic panel, CBC normal.  Seizure seems unlikely given history.  Seems unlikely to be related newly started cytomel. No preceding or ongoing chest pain, dyspnea, or other symptoms to suggest ACS or PE. Overall, workup and monitoring thus far in the ER reassuring. Discussed follow up with cardiology for consideration of any additional testing and she is going to think about this. Though she seems comfortable with going home at this point. Final disposition dependent on  imaging results and ongoing clinical course in the ED.      Medical Decision Making    History:    Supplemental history from: Documented in chart, if applicable    External Record(s) reviewed: Documented in chart, if applicable.    Work Up:    Chart documentation includes differential considered and any EKGs or imaging independently interpreted by provider, where specified.    In additional to work up documented, I considered the following work up: Documented in chart, if applicable.    External consultation:    Discussion of management with another provider: Documented in chart, if applicable    Complicating factors:    Care impacted by chronic illness: N/A    Care affected by social determinants of health: N/A    Disposition considerations: Admission considered. Patient was signed out to the oncoming physician, disposition pending.        At the conclusion of the encounter I discussed the results of all of the tests and the disposition. The questions were answered. The patient or family acknowledged understanding and was agreeable with the care plan.       0 minutes of critical care time     MEDICATIONS GIVEN IN THE EMERGENCY:  Medications   acetaminophen (TYLENOL) tablet 1,000 mg (1,000 mg Oral $Given 2/24/23 1512)   Tdap (tetanus-diphtheria-acell pertussis) (ADACEL) injection 0.5 mL (0.5 mLs Intramuscular $Given 2/24/23 1517)   lido-EPINEPHrine-tetracaine (LET) topical gel GEL 3 mL (3 mLs Topical $Given 2/24/23 1513)       NEW PRESCRIPTIONS STARTED AT TODAY'S ER VISIT  New Prescriptions    No medications on file            =================================================================    Patient information was obtained from: Patient    Use of Intrepreter: N/A    Limitations to History: None    HPI    Regina Finley is a 62 year old female with a history of ADHD, depression, hypothyroidism who presents today for evaluation of syncope.  States that she took melatonin last night to help with sleep.  She got up  around 1:30 AM to go to the bathroom and the next thing she remembers she was lying on the floor in a pool of blood.  She states she hit her head on the side of the tub.  Prior to syncope she does not recall any lightheadedness, chest pain, or dyspnea.  Also, has not noted any incontinence, oral trauma, and denies any seizure history.  Has noted some mild mid back pain that she attributes to shoveling yesterday.  No fevers, chills, urinary complaints, focal weakness, sensory changes.  Complaining of some left thigh pain secondary to falling.  Able to ambulate though pain when she ambulates.  Denies other symptoms or concerns      Review of Systems   Constitutional: Negative for chills and fever.   Respiratory: Negative for shortness of breath.    Cardiovascular: Negative for chest pain.   Gastrointestinal: Negative for blood in stool.   Genitourinary: Negative for dysuria.   Musculoskeletal: Positive for back pain. Negative for neck pain.        Left thigh pain   Neurological: Positive for loss of consciousness and headaches. Negative for seizures and weakness.   All other systems reviewed and are negative.      PAST MEDICAL HISTORY:  Past Medical History:   Diagnosis Date     Attention deficit hyperactivity disorder (ADHD), combined type 10/13/2015     Contact dermatitis and eczema 2/24/2023     Depressive disorder 10/26/2009     Generalized anxiety disorder 5/5/2014     HSIL on Pap smear of cervix 5/1/2013    Formatting of this note is different from the original. PLAN:  [facility] recommends:  History of CIN2 - CIN3:  Cotesting every 3 years for 20 years.    Pap History:  Date Value  03/22/2018 NIL/HPV negative  07/29/2015 NIL / HPV negative  07/05/2013 CONE: MICHAEL 2-3 (negative margins)  06/13/2013 Colposcopy: MICHAEL 2-3  05/28/2013 HSIL     Hypothyroidism 12/17/2008     Ventral hernia without obstruction or gangrene 2/24/2023       PAST SURGICAL HISTORY:  No past surgical history on file.        CURRENT MEDICATIONS:   "  No current facility-administered medications for this encounter.     Current Outpatient Medications   Medication     amphetamine-dextroamphetamine (ADDERALL) 30 MG tablet     levothyroxine (SYNTHROID/LEVOTHROID) 200 MCG tablet     liothyronine (CYTOMEL) 5 MCG tablet     oxyCODONE-acetaminophen (PERCOCET) 5-325 mg per tablet         ALLERGIES:  Allergies   Allergen Reactions     Amphetamine-Dextroamphetamine Hives     XR caused problems wants to try immediate release     Clindamycin Unknown     Causes diarrhea and abdominal cramps.          VITALS:  Patient Vitals for the past 24 hrs:   BP Temp Temp src Pulse Resp SpO2 Height Weight   02/24/23 1521 128/76 -- -- 71 24 -- -- --   02/24/23 1506 121/70 -- -- 81 28 97 % -- --   02/24/23 1451 108/63 -- -- 72 22 96 % -- --   02/24/23 1436 120/72 -- -- 74 27 95 % -- --   02/24/23 1421 115/72 -- -- 78 (!) 41 95 % -- --   02/24/23 1406 122/70 -- -- 77 (!) 33 94 % -- --   02/24/23 1351 116/65 -- -- 76 19 93 % -- --   02/24/23 1336 121/67 -- -- 78 15 94 % -- --   02/24/23 1318 139/70 98.5  F (36.9  C) Temporal 86 20 97 % 1.6 m (5' 3\") 85.7 kg (189 lb)       PHYSICAL EXAM    Constitutional:  alert, no distress  EYES: Conjunctivae clear  HENT: Left-sided forehead 0.5 cm and linear.  Superficial.  Explored to the base in bloodless field and no foreign body.  No underlying fracture.  Oropharynx is clear and moist.  TMs appear normal.  No meningismus  Respiratory:  No respiratory distress, normal breath sounds  Cardiovascular:  Normal rate, normal rhythm, no murmurs  GI:  Soft, nondistended, nontender, no palpable masses, no rebound, no guarding   Musculoskeletal: No midline tenderness of the cervical, thoracic, or lumbar spine.  Mild soft tissue tenderness with palpation of the left lower thoracic paraspinal soft tissues  Integument: Warm, Dry  Neurologic:  Alert & oriented x 3.  Cranial nerves II through XII are grossly intact.              LAB:  All pertinent labs reviewed and " interpreted.  Labs Ordered and Resulted from Time of ED Arrival to Time of ED Departure   TSH WITH FREE T4 REFLEX - Abnormal       Result Value    TSH 0.08 (*)    TROPONIN I - Normal    Troponin I <0.01     COMPREHENSIVE METABOLIC PANEL - Normal    Sodium 139      Potassium 4.1      Chloride 106      Carbon Dioxide (CO2) 23      Anion Gap 10      Urea Nitrogen 17      Creatinine 0.90      Calcium 9.6      Glucose 107      Alkaline Phosphatase 81      AST 19      ALT 22      Protein Total 7.5      Albumin 4.2      Bilirubin Total 0.2      GFR Estimate 73     CBC WITH PLATELETS AND DIFFERENTIAL    WBC Count 5.3      RBC Count 4.57      Hemoglobin 13.2      Hematocrit 39.7      MCV 87      MCH 28.9      MCHC 33.2      RDW 12.8      Platelet Count 233      % Neutrophils 59      % Lymphocytes 32      % Monocytes 7      % Eosinophils 2      % Basophils 0      % Immature Granulocytes 0      NRBCs per 100 WBC 0      Absolute Neutrophils 3.1      Absolute Lymphocytes 1.7      Absolute Monocytes 0.4      Absolute Eosinophils 0.1      Absolute Basophils 0.0      Absolute Immature Granulocytes 0.0      Absolute NRBCs 0.0     ROUTINE UA WITH MICROSCOPIC REFLEX TO CULTURE   T3 TOTAL   T4 FREE     EKG Interpretation  2/24/2023 at 1:28 PM    Rhythm: normal sinus  Rate: 81 bpm  Axis: normal  Ectopy: none  Conduction: normal  ST Segments: no acute change  T Waves: no acute change  Q Waves: none    Clinical Impression: no acute changes and normal EKG    I have independentlyreviewed and interpreted the patient's EKG with comments made as listed above.  Please see scanned image for full interpretation      RADIOLOGY:  Reviewed all pertinent imaging. Please see official radiology report.  Head CT w/o contrast    (Results Pending)   XR Femur Left 2 Views    (Results Pending)             PROCEDURES:   PROCEDURE: Laceration Repair   INDICATIONS: Laceration   PROCEDURE PROVIDER: Alhaji Bautista CNP   SITE: forehead   TYPE/SIZE: simple,  clean and no foreign body visualized  0.5 cm (total length)   FUNCTIONAL ASSESSMENT: Distal sensation and circulation intact   MEDICATION: LET   PREPARATION: scrubbing and irrigation with Normal saline and Betadine   DEBRIDEMENT: no debridement and wound explored, no foreign body found   CLOSURE:  Superficial layer closed with 1 stitches of 6-0 Prolene simple interrupted    Total number of sutures/staples placed: 1               JANETTE Bueno, CNP  Emergency Services  [facility] EMERGENCY ROOM  [address] [facility] [id number] [phone]  Dept: [phone]         Alhaji Bautista APRN CNP  02/24/23 1617       Alhaji Bautista APRN CNP  02/24/23 1610